# Patient Record
Sex: MALE | Race: OTHER | NOT HISPANIC OR LATINO | URBAN - METROPOLITAN AREA
[De-identification: names, ages, dates, MRNs, and addresses within clinical notes are randomized per-mention and may not be internally consistent; named-entity substitution may affect disease eponyms.]

---

## 2019-04-04 ENCOUNTER — EMERGENCY (EMERGENCY)
Facility: HOSPITAL | Age: 44
LOS: 0 days | Discharge: HOME | End: 2019-04-04
Admitting: PHYSICIAN ASSISTANT
Payer: COMMERCIAL

## 2019-04-04 VITALS
HEART RATE: 72 BPM | TEMPERATURE: 97 F | RESPIRATION RATE: 16 BRPM | SYSTOLIC BLOOD PRESSURE: 102 MMHG | DIASTOLIC BLOOD PRESSURE: 57 MMHG | OXYGEN SATURATION: 100 %

## 2019-04-04 DIAGNOSIS — M54.9 DORSALGIA, UNSPECIFIED: ICD-10-CM

## 2019-04-04 DIAGNOSIS — M54.5 LOW BACK PAIN: ICD-10-CM
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
## 2019-04-04 DIAGNOSIS — Z79.1 LONG TERM (CURRENT) USE OF NON-STEROIDAL ANTI-INFLAMMATORIES (NSAID): ICD-10-CM

## 2019-04-04 DIAGNOSIS — Z79.899 OTHER LONG TERM (CURRENT) DRUG THERAPY: ICD-10-CM
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
## 2019-04-04 PROCEDURE — 99284 EMERGENCY DEPT VISIT MOD MDM: CPT

## 2019-04-04 RX ORDER — METHOCARBAMOL 500 MG/1
1500 TABLET, FILM COATED ORAL ONCE
Qty: 0 | Refills: 0 | Status: COMPLETED | OUTPATIENT
Start: 2019-04-04 | End: 2019-04-04

## 2019-04-04 RX ORDER — METHOCARBAMOL 500 MG/1
2 TABLET, FILM COATED ORAL
Qty: 30 | Refills: 0 | OUTPATIENT
Start: 2019-04-04 | End: 2019-04-08

## 2019-04-04 RX ORDER — IBUPROFEN 200 MG
1 TABLET ORAL
Qty: 40 | Refills: 0 | OUTPATIENT
Start: 2019-04-04 | End: 2019-04-13

## 2019-04-04 RX ORDER — KETOROLAC TROMETHAMINE 30 MG/ML
30 SYRINGE (ML) INJECTION ONCE
Qty: 0 | Refills: 0 | Status: DISCONTINUED | OUTPATIENT
Start: 2019-04-04 | End: 2019-04-04

## 2019-04-04 RX ADMIN — Medication 30 MILLIGRAM(S): at 10:06

## 2019-04-04 RX ADMIN — METHOCARBAMOL 1500 MILLIGRAM(S): 500 TABLET, FILM COATED ORAL at 10:06

## 2019-04-04 NOTE — ED PROVIDER NOTE - NSFOLLOWUPCLINICS_GEN_ALL_ED_FT
[facility]  Rehabilitation  [address] [place] [address]  Phone: [phone]  Fax:   Follow Up Time: 1-3 Days

## 2019-04-04 NOTE — ED ADULT NURSE NOTE - OBJECTIVE STATEMENT
patient c/o of right side back pain after lifting a very heavy tree yesterday and he states he "heard something crack"

## 2019-04-04 NOTE — ED PROVIDER NOTE - NSFOLLOWUPINSTRUCTIONS_ED_ALL_ED_FT
-Follow up with Dr. [facility] in 1-3 days for your back pain  -Take 400-800 mg of Ibuprofen every 6-8 hours as needed for pain with food; food first, then medicine  -Take Robaxin as prescribed for your back pain; this may cause sedation so no driving/heavy machine operating  -RICE protocol  -Return to ED for worsening symptoms or concerns.    Back Pain    Back pain is very common in adults. The cause of back pain is rarely dangerous and the pain often gets better over time. The cause of your back pain may not be known and may include strain of muscles or ligaments, degeneration of the spinal disks, or arthritis. Occasionally the pain may radiate down your leg(s). Over-the-counter medicines to reduce pain and inflammation are often the most helpful. Stretching and remaining active frequently helps the healing process.     SEEK IMMEDIATE MEDICAL CARE IF YOU HAVE ANY OF THE FOLLOWING SYMPTOMS: bowel or bladder control problems, unusual weakness or numbness in your arms or legs, nausea or vomiting, abdominal pain, fever, dizziness/lightheadedness.    RICE for Routine Care of Injuries  The routine care of many injuries includes rest, ice, compression, and elevation (RICE therapy). RICE therapy is often recommended for injuries to soft tissues, such as a muscle strain, ligament injuries, bruises, and overuse injuries. It can also be used for some bony injuries. Using RICE therapy can help to relieve pain, lessen swelling, and enable your body to heal.    Rest  Rest is required to allow your body to heal. This usually involves reducing your normal activities and avoiding use of the injured part of your body. Generally, you can return to your normal activities when you are comfortable and have been given permission by your health care provider.    Ice  Image   Icing your injury helps to keep the swelling down, and it lessens pain. Do not apply ice directly to your skin.    Put ice in a plastic bag.  Place a towel between your skin and the bag.  Leave the ice on for 20 minutes, 2–3 times a day.    Do this for as long as you are directed by your health care provider.    Compression  Compression means putting pressure on the injured area. Compression helps to keep swelling down, gives support, and helps with discomfort. Compression may be done with an elastic bandage. If an elastic bandage has been applied, follow these general tips:    Remove and reapply the bandage every 3–4 hours or as directed by your health care provider.  Make sure the bandage is not wrapped too tightly, because this can cut off circulation. If part of your body beyond the bandage becomes blue, numb, cold, swollen, or more painful, your bandage is most likely too tight. If this occurs, remove your bandage and reapply it more loosely.  See your health care provider if the bandage seems to be making your problems worse rather than better.    Elevation  Elevation means keeping the injured area raised. This helps to lessen swelling and decrease pain. If possible, your injured area should be elevated at or above the level of your heart or the center of your chest.    When should I seek medical care?  If your pain and swelling continue.  If your symptoms are getting worse rather than improving.  These symptoms may indicate that further evaluation or further X-rays are needed. Sometimes, X-rays may not show a small broken bone (fracture) until a number of days later. Make a follow-up appointment with your health care provider.    When should I seek immediate medical care?  If you have sudden severe pain at or below the area of your injury.  If you have redness or increased swelling around your injury.  If you have tingling or numbness at or below the area of your injury that does not improve after you

## 2019-04-04 NOTE — ED PROVIDER NOTE - OBJECTIVE STATEMENT
43 year old male presenting with back pain since 4 PM yesterday. Patient lifting 100 lb tree and planting it when he felt a pop in his back and had pain. No radiation, pain 9/10, consistent, worse with positional change, has not tried any medication, no trauma, Denies loss of bowel/bladder control, saddle paresthesias, fevers, chills, weight loss.

## 2019-04-04 NOTE — ED PROVIDER NOTE - NS ED ROS FT
Review of Systems         Constitutional: (-) fever (-) chills (-) weakness       Head: (-) trauma       Cardiovascular: (-) chest pain (-) syncope       Respiratory: (-) cough, (-) shortness of breath       Gastrointestinal: (-) abdominal pain (-) vomiting (-) diarrhea (-) nausea        Genitourinary: -) hematuria       Musculoskeletal: (-) neck pain (+) back pain (-) joint pain       Integumentary: (-) rash       Neurological: (-) headache (-) altered mental status (-) paresthesias       Psych: (-) psych history

## 2019-04-04 NOTE — ED PROVIDER NOTE - CARE PROVIDER_API CALL
Kristofer Chin (MD)  Orthopaedic Surgery  [address] [place] [address]  Phone: [phone]  Fax: [phone]  Follow Up Time: 1-3 Days

## 2019-04-04 NOTE — ED PROVIDER NOTE - PHYSICAL EXAMINATION
Physical Exam    Vital Signs: I have reviewed the initial vital signs  Constitutional: well-nourished, appears stated age, no acute distress  Musculoskeletal: supple nontender neck, no midline tenderness, no joint pain. TTP in lumbar paraspinals, no bony tenderness. - SLR b/l, -CVAT b/l  Integumentary: warm, dry, no rash  Neurologic: A & O x 3, CN II-XII grossly intact, all extremities’ motor and sensory functions grossly intact  Psychiatric: appropriate mood, appropriate affect

## 2019-04-04 NOTE — ED ADULT NURSE NOTE - NS PRO PASSIVE SMOKE EXP
Gen: No acute distress, non toxic  HEENT: Mucous membranes moist, pink conjunctivae, EOMI  CV: RRR, nl s1/s2.  Resp: CTAB, normal rate and effort  GI: Abdomen soft, NT, ND. No rebound, no guarding  : No CVAT  Neuro: A&O x 3, moving all 4 extremities  MSK: No spine or joint tenderness to palpation  Skin: No rashes. intact and perfused.
No